# Patient Record
Sex: FEMALE | Race: OTHER | Employment: UNEMPLOYED | URBAN - METROPOLITAN AREA
[De-identification: names, ages, dates, MRNs, and addresses within clinical notes are randomized per-mention and may not be internally consistent; named-entity substitution may affect disease eponyms.]

---

## 2021-05-13 ENCOUNTER — HOSPITAL ENCOUNTER (EMERGENCY)
Age: 61
Discharge: HOME | End: 2021-05-13
Attending: EMERGENCY MEDICINE
Payer: COMMERCIAL

## 2021-05-13 VITALS
SYSTOLIC BLOOD PRESSURE: 155 MMHG | TEMPERATURE: 98.4 F | BODY MASS INDEX: 26.43 KG/M2 | DIASTOLIC BLOOD PRESSURE: 77 MMHG | WEIGHT: 140 LBS | RESPIRATION RATE: 16 BRPM | OXYGEN SATURATION: 96 % | HEIGHT: 61 IN | HEART RATE: 77 BPM

## 2021-05-13 DIAGNOSIS — M54.41 CHRONIC BILATERAL LOW BACK PAIN WITH BILATERAL SCIATICA: Primary | ICD-10-CM

## 2021-05-13 DIAGNOSIS — G89.29 CHRONIC BILATERAL LOW BACK PAIN WITH BILATERAL SCIATICA: Primary | ICD-10-CM

## 2021-05-13 DIAGNOSIS — M54.42 CHRONIC BILATERAL LOW BACK PAIN WITH BILATERAL SCIATICA: Primary | ICD-10-CM

## 2021-05-13 PROCEDURE — 6360000002 HC RX W HCPCS: Performed by: STUDENT IN AN ORGANIZED HEALTH CARE EDUCATION/TRAINING PROGRAM

## 2021-05-13 PROCEDURE — 96372 THER/PROPH/DIAG INJ SC/IM: CPT

## 2021-05-13 PROCEDURE — 96374 THER/PROPH/DIAG INJ IV PUSH: CPT

## 2021-05-13 PROCEDURE — 6370000000 HC RX 637 (ALT 250 FOR IP): Performed by: STUDENT IN AN ORGANIZED HEALTH CARE EDUCATION/TRAINING PROGRAM

## 2021-05-13 PROCEDURE — 99285 EMERGENCY DEPT VISIT HI MDM: CPT

## 2021-05-13 RX ORDER — OXCARBAZEPINE 300 MG/1
300 TABLET, FILM COATED ORAL 2 TIMES DAILY
COMMUNITY
Start: 2021-02-19

## 2021-05-13 RX ORDER — OXYCODONE HYDROCHLORIDE 5 MG/1
5 TABLET ORAL ONCE
Status: COMPLETED | OUTPATIENT
Start: 2021-05-13 | End: 2021-05-13

## 2021-05-13 RX ORDER — MELOXICAM 15 MG/1
15 TABLET ORAL DAILY
COMMUNITY

## 2021-05-13 RX ORDER — KETOROLAC TROMETHAMINE 15 MG/ML
15 INJECTION, SOLUTION INTRAMUSCULAR; INTRAVENOUS ONCE
Status: COMPLETED | OUTPATIENT
Start: 2021-05-13 | End: 2021-05-13

## 2021-05-13 RX ORDER — GABAPENTIN 300 MG/1
300 CAPSULE ORAL 3 TIMES DAILY
COMMUNITY
Start: 2021-05-11

## 2021-05-13 RX ORDER — ORPHENADRINE CITRATE 30 MG/ML
60 INJECTION INTRAMUSCULAR; INTRAVENOUS ONCE
Status: COMPLETED | OUTPATIENT
Start: 2021-05-13 | End: 2021-05-13

## 2021-05-13 RX ORDER — ACETAMINOPHEN 500 MG
1000 TABLET ORAL ONCE
Status: COMPLETED | OUTPATIENT
Start: 2021-05-13 | End: 2021-05-13

## 2021-05-13 RX ORDER — CITALOPRAM 10 MG/1
20 TABLET ORAL EVERY OTHER DAY
COMMUNITY

## 2021-05-13 RX ADMIN — ORPHENADRINE CITRATE 60 MG: 30 INJECTION INTRAMUSCULAR; INTRAVENOUS at 13:20

## 2021-05-13 RX ADMIN — ACETAMINOPHEN 1000 MG: 500 TABLET ORAL at 13:56

## 2021-05-13 RX ADMIN — OXYCODONE HYDROCHLORIDE 5 MG: 5 TABLET ORAL at 13:56

## 2021-05-13 RX ADMIN — KETOROLAC TROMETHAMINE 15 MG: 15 INJECTION, SOLUTION INTRAMUSCULAR; INTRAVENOUS at 13:13

## 2021-05-13 ASSESSMENT — ENCOUNTER SYMPTOMS
ABDOMINAL PAIN: 0
SHORTNESS OF BREATH: 0
VOMITING: 0
BACK PAIN: 1
NAUSEA: 0
DIARRHEA: 0

## 2021-05-13 ASSESSMENT — PAIN DESCRIPTION - PROGRESSION: CLINICAL_PROGRESSION: GRADUALLY IMPROVING

## 2021-05-13 ASSESSMENT — PAIN DESCRIPTION - FREQUENCY: FREQUENCY: CONTINUOUS

## 2021-05-13 ASSESSMENT — PAIN DESCRIPTION - DESCRIPTORS: DESCRIPTORS: ACHING;CONSTANT;RADIATING;SHARP;SHOOTING

## 2021-05-13 ASSESSMENT — PAIN SCALES - GENERAL: PAINLEVEL_OUTOF10: 10

## 2021-05-13 ASSESSMENT — PAIN DESCRIPTION - ORIENTATION: ORIENTATION: LEFT;RIGHT;LOWER;MID

## 2021-05-13 NOTE — ED PROVIDER NOTES
described in the history of present illness. He/she was evaluated in the context of the global COVID-19 pandemic, which necessitated consideration that the patient might be at risk for infection with the SARS-CoV-2 virus that causes COVID-19. Institutional protocols and algorithms that pertain to the evaluation of patients at risk for COVID-19 are in a state of rapid change based on information released by regulatory bodies including the CDC and federal and state organizations. These policies and algorithms were followed during the patient's care in the ED. (Please note that portions of this note were completed with a voice recognition program. Efforts were made to edit the dictations but occasionally words are mis-transcribed.  Whenever words are used in this note in any gender, they shall be construed as though they were used in the gender appropriate to the circumstances; and whenever words are used in this note in the singular or plural form, they shall be construed as though they were used in the form appropriate to the circumstances.)    MD Sreedhar Raya  Attending Emergency Medicine Physician           Joseph Mcdaniel MD  05/13/21 [address] [place] [address] Street, MD  05/13/21 [address] [place], [place] [address]Th Street, MD  05/13/21 1941

## 2021-05-13 NOTE — ED PROVIDER NOTES
[facility] ED  Emergency Department Encounter  Emergency Medicine Resident     Pt Name: Candi Larios  MRN: [id number]  [place] 1960  Date of evaluation: 5/13/21  PCP:  Sunni Emerson DO    CHIEF COMPLAINT       Chief Complaint   Patient presents with    Back Pain     on going chronic back pain with bulging disc uinable to get out of bed today from too much pain       HISTORY OFPRESENT ILLNESS  (Location/Symptom, Timing/Onset, Context/Setting, Quality, Duration, Modifying Factors,Severity.)      Candi Larios is a 62 y.o. female who presents with cute on chronic low back pain. Patient states she has been dealing with this for several months. She is currently following up with physical medicine and rehabilitation and tells me she has an appointment with neurosurgery at the end of the month. For the past 2 days, patient has had a lot of difficulty getting out of bed due to severe pain. The pain is usually more right-sided than left, but both sides radiate down the back of her legs towards her ankles. She denies any weakness, numbness, tingling. No saddle anesthesia, urinary or bowel retention or incontinence. Patient's past medical history also significant for seizure disorder. She states she has been taking her medications as scheduled. She also was recently started on gabapentin and tramadol but has only taken about 1 days worth of these medications. PAST MEDICAL / SURGICAL / SOCIAL / FAMILY HISTORY      has no past medical history on file. has no past surgical history on file.      Social History     Socioeconomic History    Marital status:      Spouse name: Not on file    Number of children: Not on file    Years of education: Not on file    Highest education level: Not on file   Occupational History    Not on file   Social Needs    Financial resource strain: Not on file    Food insecurity     Worry: Not on file     Inability: Not on file   [place] needs     Medical: Not on file     Non-medical: Not on file   Tobacco Use    Smoking status: Not on file   Substance and Sexual Activity    Alcohol use: Not on file    Drug use: Not on file    Sexual activity: Not on file   Lifestyle    Physical activity     Days per week: Not on file     Minutes per session: Not on file    Stress: Not on file   Relationships    Social connections     Talks on phone: Not on file     Gets together: Not on file     Attends Islam service: Not on file     Active member of club or organization: Not on file     Attends meetings of clubs or organizations: Not on file     Relationship status: Not on file    Intimate partner violence     Fear of current or ex partner: Not on file     Emotionally abused: Not on file     Physically abused: Not on file     Forced sexual activity: Not on file   Other Topics Concern    Not on file   Social History Narrative    Not on file       No family history on file. Allergies:  Patient has no known allergies. Home Medications:  Prior to Admission medications    Medication Sig Start Date End Date Taking? Authorizing Provider   OXcarbazepine (TRILEPTAL) 300 MG tablet Take 300 mg by mouth 2 times daily 2/19/21  Yes Historical Provider, MD   gabapentin (NEURONTIN) 300 MG capsule Take 300 mg by mouth 3 times daily. 5/11/21  Yes Historical Provider, MD   citalopram (CELEXA) 10 MG tablet Take 20 mg by mouth every other day    Historical Provider, MD   meloxicam (MOBIC) 15 MG tablet Take 15 mg by mouth daily    Historical Provider, MD   Multiple Vitamins-Minerals (MULTIVITAMIN ADULT EXTRA C PO) Take 1 tablet by mouth daily    Historical Provider, MD       REVIEW OFSYSTEMS    (2-9 systems for level 4, 10 or more for level 5)      Review of Systems   Constitutional: Negative for chills and fever. HENT: Negative for congestion. Eyes: Negative for visual disturbance. Respiratory: Negative for shortness of breath.     Cardiovascular: Negative for chest pain. Gastrointestinal: Negative for abdominal pain, diarrhea, nausea and vomiting. Genitourinary: Negative for dysuria. Musculoskeletal: Positive for back pain. Negative for neck pain. Skin: Negative for rash and wound. Neurological: Negative for weakness, numbness and headaches. PHYSICAL EXAM   (up to 7 for level 4, 8 or more forlevel 5)      INITIAL VITALS:   ED Triage Vitals [05/13/21 1226]   BP Temp Temp Source Pulse Resp SpO2 Height Weight   (!) 155/77 98.4 °F (36.9 °C) Oral 77 16 96 % 5' 1\" (1.549 m) 140 lb (63.5 kg)       Physical Exam  Constitutional:       General: She is not in acute distress. Appearance: Normal appearance. She is normal weight. She is not ill-appearing, toxic-appearing or diaphoretic. HENT:      Head: Normocephalic and atraumatic. Nose: Nose normal.      Mouth/Throat:      Mouth: Mucous membranes are moist.      Pharynx: Oropharynx is clear. Eyes:      Extraocular Movements: Extraocular movements intact. Pupils: Pupils are equal, round, and reactive to light. Neck:      Musculoskeletal: Normal range of motion and neck supple. Cardiovascular:      Rate and Rhythm: Normal rate and regular rhythm. Heart sounds: Normal heart sounds. No murmur. Pulmonary:      Effort: Pulmonary effort is normal. No respiratory distress. Breath sounds: Normal breath sounds. No wheezing, rhonchi or rales. Abdominal:      General: There is no distension. Palpations: Abdomen is soft. Tenderness: There is no abdominal tenderness. There is no guarding or rebound. Musculoskeletal: Normal range of motion. Right lower leg: No edema. Left lower leg: No edema. Comments: Tenderness palpation over the lumbar region of the back, bilaterally. No overlying rashes or wounds. Straight leg test worsens pain. No obvious injuries or swelling. Skin:     General: Skin is warm and dry. Neurological:      General: No focal deficit present. Mental Status: She is alert and oriented to person, place, and time. Sensory: No sensory deficit. Motor: No weakness. Comments: Lower extremity strength and sensation symmetrical and normal.  Patient denies saddle anesthesia. Psychiatric:         Mood and Affect: Mood normal.         DIFFERENTIAL  DIAGNOSIS     PLAN (LABS / IMAGING / EKG):  No orders of the defined types were placed in this encounter. MEDICATIONS ORDERED:  Orders Placed This Encounter   Medications    ketorolac (TORADOL) injection 15 mg    orphenadrine (NORFLEX) injection 60 mg    acetaminophen (TYLENOL) tablet 1,000 mg    oxyCODONE (ROXICODONE) immediate release tablet 5 mg     Initial MDM/Plan/ED COURSE:    62 y.o. female who presents with acute on chronic low back pain. Patient does follow with physical medicine rehabilitation and has clinic appointment for neurosurgery at the end of the month. Pain has become severe over the past few days, not associated with weakness, numbness, tingling. No saddle anesthesia, urinary or bowel incontinence or retention. On exam, patient is in mild distress when moving due to pain, but otherwise resting comfortably in bed. Vitals are within normal limits with blood pressure slightly elevated at 155/77. Strength and sensation are symmetrical and normal in the lower extremities. Straight leg test does worsen the pain in the low back. Will provide analgesia first with Toradol and Norflex. Will reevaluate and consider giving 1 dose of opioid here in the ED. Discussed with patient that her follow-up and continued work with PM&R are very important for improving this pain. We will also provide information for neurosurgery clinic, but she tells me she has an appointment at the end of the month with neurosurgery. Patient did not have significant improvement with the Toradol and muscle relaxant.   We will give Tylenol and Roxicodone, and discharge home.      :     DIAGNOSTIC RESULTS / EMERGENCYDEPARTMENT COURSE / MDM     LABS:  Labs Reviewed - No data to display        No results found. EKG  Not indicated at this time. All EKG's are interpreted by the Emergency Department Physicianwho either signs or Co-signs this chart in the absence of a cardiologist.      PROCEDURES:  None    CONSULTS:  None    CRITICAL CARE:  Please see attending note    FINAL IMPRESSION      1.  Chronic bilateral low back pain with bilateral sciatica          DISPOSITION / PLAN     DISPOSITION Decision To Discharge 05/13/2021 01:48:59 PM      PATIENT REFERRED TO:  Alberto Gorman DO  [address]  [phone]    Call [facility]  [address] [address]  [phone]    If symptoms worsen    [address]            DISCHARGE MEDICATIONS:  New Prescriptions    No medications on file       Praveen Vasquez DO  Emergency Medicine Resident    (Please note that portions of this note were completed with a voice recognition program.Efforts were made to edit the dictations but occasionally words are mis-transcribed.)       Praveen Vasquez DO  Resident  05/13/21 1078

## 2021-06-01 ENCOUNTER — HOSPITAL ENCOUNTER (EMERGENCY)
Age: 61
Discharge: HOME | End: 2021-06-01
Attending: EMERGENCY MEDICINE
Payer: COMMERCIAL

## 2021-06-01 VITALS
RESPIRATION RATE: 18 BRPM | DIASTOLIC BLOOD PRESSURE: 65 MMHG | SYSTOLIC BLOOD PRESSURE: 138 MMHG | TEMPERATURE: 97.3 F | OXYGEN SATURATION: 96 % | HEART RATE: 78 BPM

## 2021-06-01 DIAGNOSIS — G89.29 ACUTE EXACERBATION OF CHRONIC LOW BACK PAIN: Primary | ICD-10-CM

## 2021-06-01 DIAGNOSIS — M54.50 ACUTE EXACERBATION OF CHRONIC LOW BACK PAIN: Primary | ICD-10-CM

## 2021-06-01 PROCEDURE — 99283 EMERGENCY DEPT VISIT LOW MDM: CPT

## 2021-06-01 PROCEDURE — 96372 THER/PROPH/DIAG INJ SC/IM: CPT

## 2021-06-01 PROCEDURE — 6360000002 HC RX W HCPCS: Performed by: STUDENT IN AN ORGANIZED HEALTH CARE EDUCATION/TRAINING PROGRAM

## 2021-06-01 RX ORDER — MORPHINE SULFATE 4 MG/ML
4 INJECTION, SOLUTION INTRAMUSCULAR; INTRAVENOUS ONCE
Status: DISCONTINUED | OUTPATIENT
Start: 2021-06-01 | End: 2021-06-01

## 2021-06-01 RX ORDER — CYCLOBENZAPRINE HCL 10 MG
10 TABLET ORAL 3 TIMES DAILY PRN
Qty: 21 TABLET | Refills: 0 | Status: SHIPPED | OUTPATIENT
Start: 2021-06-01 | End: 2021-06-11

## 2021-06-01 RX ORDER — MORPHINE SULFATE 4 MG/ML
4 INJECTION, SOLUTION INTRAMUSCULAR; INTRAVENOUS ONCE
Status: COMPLETED | OUTPATIENT
Start: 2021-06-01 | End: 2021-06-01

## 2021-06-01 RX ORDER — LIDOCAINE 50 MG/G
1 PATCH TOPICAL DAILY
Qty: 10 PATCH | Refills: 0 | Status: SHIPPED | OUTPATIENT
Start: 2021-06-01 | End: 2021-06-11

## 2021-06-01 RX ADMIN — MORPHINE SULFATE 4 MG: 4 INJECTION INTRAVENOUS at 07:30

## 2021-06-01 ASSESSMENT — PAIN SCALES - GENERAL
PAINLEVEL_OUTOF10: 10
PAINLEVEL_OUTOF10: 10

## 2021-06-01 ASSESSMENT — PAIN DESCRIPTION - LOCATION: LOCATION: BACK

## 2021-06-01 NOTE — ED NOTES
Pt assisted up to bedside commode. Pt wheeled out to triage in wheelchair.      Vic Lee RN  06/01/21 9317

## 2021-06-01 NOTE — ED PROVIDER NOTES
[address] ED     Emergency Department     Faculty Attestation        I performed a history and physical examination of the patient and discussed management with the resident. I reviewed the residents note and agree with the documented findings and plan of care. Any areas of disagreement are noted on the chart. I was personally present for the key portions of any procedures. I have documented in the chart those procedures where I was not present during the key portions. I have reviewed the emergency nurses triage note. I agree with the chief complaint, past medical history, past surgical history, allergies, medications, social and family history as documented unless otherwise noted below. For mid-level providers such as nurse practitioners as well as physicians assistants:    I have personally seen and evaluated the patient. I find the patient's history and physical exam are consistent with NP/PA documentation. I agree with the care provided, treatment rendered, disposition, & follow-up plan. Additional findings are as noted. Vital Signs: /65   Pulse 78   Temp 97.3 °F (36.3 °C) (Oral)   Resp 18   SpO2 96%   PCP:  Fco Sheffield,     Pertinent Comments:       Exacerbation of chronic back pain scheduled for surgery next week. Denies any fevers or chills or falls or trauma. No anticoagulation use. No history of IV drug abuse. She is able to walk and ambulate in the examination room for me with no assistance. She has 5 out of 5 strength in lower extremities. She denies any bowel or bladder incontinence but states the pain is so severe she cannot get to the bathroom in time. We provide analgesics, reassessment. At this time there is no indication for emergent MRI imaging.     Critical Care  None          Jasmin Noyola MD    Attending Emergency Medicine Physician              Flower Ybarra MD  06/01/21 9002

## 2021-06-01 NOTE — ED NOTES
Bed: 20  Expected date:   Expected time:   Means of arrival:   Comments:  [facility] [address] [facility], RN  06/01/21 3654

## 2021-06-01 NOTE — ED PROVIDER NOTES
[address]  ED  Emergency Department Encounter  EmergencyMedicine Resident     Pt Name:Iza Baez  MRN: [id number]  [place] 1960  Date of evaluation: 6/1/21  PCP:  Pilo Roque DO    CHIEF COMPLAINT       Chief Complaint   Patient presents with    Back Pain     has surgery next wednesday       HISTORY OF PRESENT ILLNESS  (Location/Symptom, Timing/Onset, Context/Setting, Quality, Duration, Modifying Factors, Severity.)      Iza Baez is a 62 y.o. female who presents with acute on chronic lower back pain. Patient reporting that she has history of chronic low back pain with degenerative disease and arthritic changes. Patient stating that she has had 3 worsening back pain over the past 24 hours. States she is scheduled for surgery next week for lumbar laminectomy. Patient denies any recent injury. Denies saddle anesthesia, urinary retention, urinary incontinence, gait abnormality, numbness, weakness. PAST MEDICAL / SURGICAL / SOCIAL / FAMILY HISTORY   History of chronic low back pain    Denies any significant surgical history    Social History     Socioeconomic History    Marital status:      Spouse name: Not on file    Number of children: Not on file    Years of education: Not on file    Highest education level: Not on file   Occupational History    Not on file   Tobacco Use    Smoking status: Not on file   Substance and Sexual Activity    Alcohol use: Not on file    Drug use: Not on file    Sexual activity: Not on file   Other Topics Concern    Not on file   Social History Narrative    Not on file     Social Determinants of Health     Financial Resource Strain:     Difficulty of Paying Living Expenses:    Food Insecurity:     Worried About Running Out of Food in the Last Year:     [address] in the Last Year:    Transportation Needs:     Lack of Transportation (Medical):      Lack of Transportation (Non-Medical):    Physical Activity:     Days of Exercise per Week:     Minutes of Exercise per Session:    Stress:     Feeling of Stress :    Social Connections:     Frequency of Communication with Friends and Family:     Frequency of Social Gatherings with Friends and Family:     Attends Ten[employer] Services:     Active Member of Clubs or Organizations:     Attends Club or Organization Meetings:     Marital Status:    Intimate Partner Violence:     Fear of Current or Ex-Partner:     Emotionally Abused:     Physically Abused:     Sexually Abused:        No family history on file. Allergies:  Patient has no known allergies. Home Medications:  Prior to Admission medications    Medication Sig Start Date End Date Taking? Authorizing Provider   lidocaine (LIDODERM) 5 % Place 1 patch onto the skin daily for 10 days 12 hours on, 12 hours off. 6/1/21 6/11/21 Yes Miguel Turcios, DO   cyclobenzaprine (FLEXERIL) 10 MG tablet Take 1 tablet by mouth 3 times daily as needed for Muscle spasms 6/1/21 6/11/21 Yes Miguel Turcios, DO   OXcarbazepine (TRILEPTAL) 300 MG tablet Take 300 mg by mouth 2 times daily 2/19/21   Historical Provider, MD   citalopram (CELEXA) 10 MG tablet Take 20 mg by mouth every other day    Historical Provider, MD   gabapentin (NEURONTIN) 300 MG capsule Take 300 mg by mouth 3 times daily. 5/11/21   Historical Provider, MD   meloxicam (MOBIC) 15 MG tablet Take 15 mg by mouth daily    Historical Provider, MD   Multiple Vitamins-Minerals (MULTIVITAMIN ADULT EXTRA C PO) Take 1 tablet by mouth daily    Historical Provider, MD       REVIEW OF SYSTEMS    (2-9 systems for level 4, 10 or more for level 5)      Review of Systems   Constitutional: Negative for fatigue and fever. HENT: Negative for sinus pressure and sinus pain. Eyes: Negative for photophobia and visual disturbance. Respiratory: Negative for cough and shortness of breath. Cardiovascular: Negative for chest pain.    Gastrointestinal: Negative for abdominal pain, nausea and vomiting. Endocrine: Negative for polyuria. Genitourinary: Negative for difficulty urinating, dysuria and flank pain. Musculoskeletal: Positive for back pain and gait problem. Negative for neck pain and neck stiffness. Skin: Negative for rash and wound. Neurological: Negative for dizziness, seizures, facial asymmetry, weakness and numbness. Psychiatric/Behavioral: Negative for confusion. PHYSICAL EXAM   (up to 7 for level 4, 8 or more for level 5)      INITIAL VITALS:   /65   Pulse 78   Temp 97.3 °F (36.3 °C) (Oral)   Resp 18   SpO2 96%     Physical Exam  Constitutional:       General: She is not in acute distress. Appearance: She is not toxic-appearing. HENT:      Head: Normocephalic and atraumatic. Nose: No congestion or rhinorrhea. Mouth/Throat:      Mouth: Mucous membranes are moist.      Pharynx: Oropharynx is clear. Cardiovascular:      Rate and Rhythm: Normal rate. Heart sounds: No murmur heard. No friction rub. No gallop. Pulmonary:      Effort: No respiratory distress. Breath sounds: No wheezing, rhonchi or rales. Abdominal:      General: There is no distension. Tenderness: There is no abdominal tenderness. There is no guarding. Musculoskeletal:         General: No tenderness. Right lower leg: No edema. Left lower leg: No edema. Skin:     Findings: No bruising, lesion or rash. Neurological:      Mental Status: She is alert. Sensory: No sensory deficit. Motor: No weakness. Gait: Gait normal.         DIFFERENTIAL  DIAGNOSIS     PLAN (LABS / IMAGING / EKG):  No orders of the defined types were placed in this encounter. MEDICATIONS ORDERED:  Orders Placed This Encounter   Medications    DISCONTD: morphine injection 4 mg    morphine (PF) injection 4 mg    lidocaine (LIDODERM) 5 %     Sig: Place 1 patch onto the skin daily for 10 days 12 hours on, 12 hours off.      Dispense:  10 patch     Refill:  0    DO  Resident  06/03/21 7092

## 2021-06-02 ASSESSMENT — ENCOUNTER SYMPTOMS
SINUS PRESSURE: 0
VOMITING: 0
SINUS PAIN: 0
NAUSEA: 0
BACK PAIN: 1
PHOTOPHOBIA: 0
ABDOMINAL PAIN: 0
COUGH: 0
SHORTNESS OF BREATH: 0

## 2021-06-06 ENCOUNTER — APPOINTMENT (OUTPATIENT)
Dept: GENERAL RADIOLOGY | Age: 61
End: 2021-06-06
Payer: COMMERCIAL

## 2021-06-06 ENCOUNTER — HOSPITAL ENCOUNTER (EMERGENCY)
Age: 61
Discharge: HOME | End: 2021-06-06
Attending: EMERGENCY MEDICINE
Payer: COMMERCIAL

## 2021-06-06 VITALS
HEIGHT: 64 IN | SYSTOLIC BLOOD PRESSURE: 124 MMHG | BODY MASS INDEX: 23.05 KG/M2 | DIASTOLIC BLOOD PRESSURE: 72 MMHG | OXYGEN SATURATION: 97 % | RESPIRATION RATE: 18 BRPM | WEIGHT: 135 LBS | HEART RATE: 94 BPM | TEMPERATURE: 98.3 F

## 2021-06-06 DIAGNOSIS — G89.29 CHRONIC MIDLINE LOW BACK PAIN, UNSPECIFIED WHETHER SCIATICA PRESENT: Primary | ICD-10-CM

## 2021-06-06 DIAGNOSIS — M54.50 CHRONIC MIDLINE LOW BACK PAIN, UNSPECIFIED WHETHER SCIATICA PRESENT: Primary | ICD-10-CM

## 2021-06-06 LAB
ABSOLUTE EOS #: 0.22 K/UL (ref 0–0.44)
ABSOLUTE IMMATURE GRANULOCYTE: 0.09 K/UL (ref 0–0.3)
ABSOLUTE LYMPH #: 2.13 K/UL (ref 1.1–3.7)
ABSOLUTE MONO #: 0.75 K/UL (ref 0.1–1.2)
ANION GAP SERPL CALCULATED.3IONS-SCNC: 8 MMOL/L (ref 9–17)
BASOPHILS # BLD: 1 % (ref 0–2)
BASOPHILS ABSOLUTE: 0.06 K/UL (ref 0–0.2)
BUN BLDV-MCNC: 15 MG/DL (ref 6–20)
BUN/CREAT BLD: ABNORMAL (ref 9–20)
C-REACTIVE PROTEIN: 11.9 MG/L (ref 0–5)
CALCIUM SERPL-MCNC: 8.9 MG/DL (ref 8.6–10.4)
CHLORIDE BLD-SCNC: 100 MMOL/L (ref 98–107)
CO2: 28 MMOL/L (ref 20–31)
CREAT SERPL-MCNC: 0.72 MG/DL (ref 0.5–0.9)
DIFFERENTIAL TYPE: ABNORMAL
EOSINOPHILS RELATIVE PERCENT: 2 % (ref 1–4)
GFR AFRICAN AMERICAN: >60 ML/MIN
GFR NON-AFRICAN AMERICAN: >60 ML/MIN
GFR SERPL CREATININE-BSD FRML MDRD: ABNORMAL ML/MIN/{1.73_M2}
GFR SERPL CREATININE-BSD FRML MDRD: ABNORMAL ML/MIN/{1.73_M2}
GLUCOSE BLD-MCNC: 111 MG/DL (ref 70–99)
HCT VFR BLD CALC: 33.9 % (ref 36.3–47.1)
HEMOGLOBIN: 11 G/DL (ref 11.9–15.1)
IMMATURE GRANULOCYTES: 1 %
INR BLD: 0.9
LYMPHOCYTES # BLD: 21 % (ref 24–43)
MCH RBC QN AUTO: 30.6 PG (ref 25.2–33.5)
MCHC RBC AUTO-ENTMCNC: 32.4 G/DL (ref 28.4–34.8)
MCV RBC AUTO: 94.4 FL (ref 82.6–102.9)
MONOCYTES # BLD: 8 % (ref 3–12)
NRBC AUTOMATED: 0 PER 100 WBC
PARTIAL THROMBOPLASTIN TIME: 25.9 SEC (ref 20.5–30.5)
PDW BLD-RTO: 12.7 % (ref 11.8–14.4)
PLATELET # BLD: 432 K/UL (ref 138–453)
PLATELET ESTIMATE: ABNORMAL
PMV BLD AUTO: 8.2 FL (ref 8.1–13.5)
POTASSIUM SERPL-SCNC: 3.6 MMOL/L (ref 3.7–5.3)
PROTHROMBIN TIME: 9.5 SEC (ref 9.1–12.3)
RBC # BLD: 3.59 M/UL (ref 3.95–5.11)
RBC # BLD: ABNORMAL 10*6/UL
SEDIMENTATION RATE, ERYTHROCYTE: 36 MM (ref 0–30)
SEG NEUTROPHILS: 67 % (ref 36–65)
SEGMENTED NEUTROPHILS ABSOLUTE COUNT: 6.79 K/UL (ref 1.5–8.1)
SODIUM BLD-SCNC: 136 MMOL/L (ref 135–144)
WBC # BLD: 10 K/UL (ref 3.5–11.3)
WBC # BLD: ABNORMAL 10*3/UL

## 2021-06-06 PROCEDURE — 80048 BASIC METABOLIC PNL TOTAL CA: CPT

## 2021-06-06 PROCEDURE — 85610 PROTHROMBIN TIME: CPT

## 2021-06-06 PROCEDURE — 99285 EMERGENCY DEPT VISIT HI MDM: CPT

## 2021-06-06 PROCEDURE — 73610 X-RAY EXAM OF ANKLE: CPT

## 2021-06-06 PROCEDURE — 85652 RBC SED RATE AUTOMATED: CPT

## 2021-06-06 PROCEDURE — 85730 THROMBOPLASTIN TIME PARTIAL: CPT

## 2021-06-06 PROCEDURE — 85025 COMPLETE CBC W/AUTO DIFF WBC: CPT

## 2021-06-06 PROCEDURE — 86140 C-REACTIVE PROTEIN: CPT

## 2021-06-06 PROCEDURE — 6370000000 HC RX 637 (ALT 250 FOR IP): Performed by: STUDENT IN AN ORGANIZED HEALTH CARE EDUCATION/TRAINING PROGRAM

## 2021-06-06 RX ORDER — KETOROLAC TROMETHAMINE 30 MG/ML
30 INJECTION, SOLUTION INTRAMUSCULAR; INTRAVENOUS ONCE
Status: DISCONTINUED | OUTPATIENT
Start: 2021-06-06 | End: 2021-06-06

## 2021-06-06 RX ORDER — OXYCODONE HYDROCHLORIDE AND ACETAMINOPHEN 5; 325 MG/1; MG/1
1 TABLET ORAL EVERY 6 HOURS PRN
Qty: 6 TABLET | Refills: 0 | Status: SHIPPED | OUTPATIENT
Start: 2021-06-06 | End: 2021-06-09

## 2021-06-06 RX ORDER — ACETAMINOPHEN 325 MG/1
325 TABLET ORAL ONCE
Status: COMPLETED | OUTPATIENT
Start: 2021-06-06 | End: 2021-06-06

## 2021-06-06 RX ORDER — TRAMADOL HYDROCHLORIDE 50 MG/1
50 TABLET ORAL EVERY 6 HOURS PRN
COMMUNITY

## 2021-06-06 RX ORDER — OXYCODONE HYDROCHLORIDE AND ACETAMINOPHEN 5; 325 MG/1; MG/1
1 TABLET ORAL ONCE
Status: COMPLETED | OUTPATIENT
Start: 2021-06-06 | End: 2021-06-06

## 2021-06-06 RX ORDER — LIDOCAINE 4 G/G
1 PATCH TOPICAL ONCE
Status: DISCONTINUED | OUTPATIENT
Start: 2021-06-06 | End: 2021-06-07 | Stop reason: HOSPADM

## 2021-06-06 RX ADMIN — OXYCODONE HYDROCHLORIDE AND ACETAMINOPHEN 1 TABLET: 5; 325 TABLET ORAL at 21:05

## 2021-06-06 RX ADMIN — ACETAMINOPHEN 325 MG: 325 TABLET ORAL at 21:06

## 2021-06-06 ASSESSMENT — PAIN SCALES - GENERAL: PAINLEVEL_OUTOF10: 6

## 2021-06-06 ASSESSMENT — PAIN DESCRIPTION - LOCATION: LOCATION: BACK

## 2021-06-07 NOTE — ED NOTES
Pt reports c/o worsening & uncontrollable chronic back pain, pt states she has a lumbar laminectomy scheduled for 06/09/2021, pt also c/o RLE swelling, ecchymosis noted to lateral RLE, pt tearful, pt A&Ox4, RR even/unlabored, call light within reach, will cont to monitor        Les Tompkins RN  06/06/21 2048

## 2021-06-07 NOTE — ED PROVIDER NOTES
Packs/day: 1.00     Types: Cigarettes   Substance and Sexual Activity    Alcohol use: Never    Drug use: Never    Sexual activity: Not on file   Other Topics Concern    Not on file   Social History Narrative    Not on file     Social Determinants of Health     Financial Resource Strain:     Difficulty of Paying Living Expenses:    Food Insecurity:     Worried About Running Out of Food in the Last Year:     [address] in the Last Year:    Transportation Needs:     Lack of Transportation (Medical):  Lack of Transportation (Non-Medical):    Physical Activity:     Days of Exercise per Week:     Minutes of Exercise per Session:    Stress:     Feeling of Stress :    Social Connections:     Frequency of Communication with Friends and Family:     Frequency of Social Gatherings with Friends and Family:     Attends Islam Services:     Active Member of Clubs or Organizations:     Attends Club or Organization Meetings:     Marital Status:    Intimate Partner Violence:     Fear of Current or Ex-Partner:     Emotionally Abused:     Physically Abused:     Sexually Abused:        History reviewed. No pertinent family history. Allergies:  Patient has no known allergies. Home Medications:  Prior to Admission medications    Medication Sig Start Date End Date Taking? Authorizing Provider   traMADol (ULTRAM) 50 MG tablet Take 50 mg by mouth every 6 hours as needed for Pain. Yes Historical Provider, MD   oxyCODONE-acetaminophen (PERCOCET) 5-325 MG per tablet Take 1 tablet by mouth every 6 hours as needed for Pain for up to 3 days. Intended supply: 3 days.  Take lowest dose possible to manage pain 6/6/21 6/9/21 Yes Jeffy Bennett MD   lidocaine (LIDODERM) 5 % Place 1 patch onto the skin daily for 10 days 12 hours on, 12 hours off. 6/1/21 6/11/21  Polina Mandujano DO   cyclobenzaprine (FLEXERIL) 10 MG tablet Take 1 tablet by mouth 3 times daily as needed for Muscle spasms 6/1/21 6/11/21  [facility], DO   OXcarbazepine (TRILEPTAL) 300 MG tablet Take 300 mg by mouth 2 times daily 2/19/21   Historical Provider, MD   citalopram (CELEXA) 10 MG tablet Take 20 mg by mouth every other day    Historical Provider, MD   gabapentin (NEURONTIN) 300 MG capsule Take 300 mg by mouth 3 times daily. 5/11/21   Historical Provider, MD   meloxicam (MOBIC) 15 MG tablet Take 15 mg by mouth daily    Historical Provider, MD   Multiple Vitamins-Minerals (MULTIVITAMIN ADULT EXTRA C PO) Take 1 tablet by mouth daily    Historical Provider, MD       REVIEW OF SYSTEMS    (2-9 systems for level 4, 10 or more for level 5)      Review of Systems   Constitutional: Negative for fever. HENT: Negative for congestion. Eyes: Negative for photophobia. Respiratory: Negative for shortness of breath. Cardiovascular: Negative for chest pain. Gastrointestinal: Negative for abdominal pain and vomiting. Endocrine: Negative for polyuria. Genitourinary: Negative for dysuria. Musculoskeletal: Positive for arthralgias. Skin: Negative for color change. Allergic/Immunologic: Negative for immunocompromised state. Neurological: Negative for dizziness. Hematological: Does not bruise/bleed easily. Psychiatric/Behavioral: Negative for agitation. PHYSICAL EXAM   (up to 7 for level 4, 8 or more for level 5)      INITIAL VITALS:   /72   Pulse 94   Temp 98.3 °F (36.8 °C) (Oral)   Resp 18   Ht 5' 4\" (1.626 m)   Wt 135 lb (61.2 kg)   SpO2 97%   BMI 23.17 kg/m²     Physical Exam  Constitutional:       General: Not in acute distress. Appearance: Normal appearance. Normal weight. Not toxic-appearing. HENT:      Head: Normocephalic and atraumatic. Nose: Nose normal.      Mouth/Throat: Mucous membranes are moist.  Uvula midline no oropharyngeal edema. Pharynx: Oropharynx is clear. Eyes:      Extraocular Movements: Extraocular movements intact.       Conjunctiva/sclera: Conjunctivae normal.      Pupils: Pupils Question:   Please select a reason the therapeutic interchange was not accepted: Answer:   [place] for Pharmacy to Substitute    acetaminophen (TYLENOL) tablet 325 mg    oxyCODONE-acetaminophen (PERCOCET) 5-325 MG per tablet     Sig: Take 1 tablet by mouth every 6 hours as needed for Pain for up to 3 days. Intended supply: 3 days.  Take lowest dose possible to manage pain     Dispense:  6 tablet     Refill:  0           DIAGNOSTIC RESULTS / EMERGENCY DEPARTMENT COURSE / MDM     LABS:  Results for orders placed or performed during the hospital encounter of 06/06/21   CBC Auto Differential   Result Value Ref Range    WBC 10.0 3.5 - 11.3 k/uL    RBC 3.59 (L) 3.95 - 5.11 m/uL    Hemoglobin 11.0 (L) 11.9 - 15.1 g/dL    Hematocrit 33.9 (L) 36.3 - 47.1 %    MCV 94.4 82.6 - 102.9 fL    MCH 30.6 25.2 - 33.5 pg    MCHC 32.4 28.4 - 34.8 g/dL    RDW 12.7 11.8 - 14.4 %    Platelets 728 498 - 763 k/uL    MPV 8.2 8.1 - 13.5 fL    NRBC Automated 0.0 0.0 per 100 WBC    Differential Type NOT REPORTED     Seg Neutrophils 67 (H) 36 - 65 %    Lymphocytes 21 (L) 24 - 43 %    Monocytes 8 3 - 12 %    Eosinophils % 2 1 - 4 %    Basophils 1 0 - 2 %    Immature Granulocytes 1 (H) 0 %    Segs Absolute 6.79 1.50 - 8.10 k/uL    Absolute Lymph # 2.13 1.10 - 3.70 k/uL    Absolute Mono # 0.75 0.10 - 1.20 k/uL    Absolute Eos # 0.22 0.00 - 0.44 k/uL    Basophils Absolute 0.06 0.00 - 0.20 k/uL    Absolute Immature Granulocyte 0.09 0.00 - 0.30 k/uL    WBC Morphology NOT REPORTED     RBC Morphology NOT REPORTED     Platelet Estimate NOT REPORTED    Basic Metabolic Panel w/ Reflex to MG   Result Value Ref Range    Glucose 111 (H) 70 - 99 mg/dL    BUN 15 6 - 20 mg/dL    CREATININE 0.72 0.50 - 0.90 mg/dL    Bun/Cre Ratio NOT REPORTED 9 - 20    Calcium 8.9 8.6 - 10.4 mg/dL    Sodium 136 135 - 144 mmol/L    Potassium 3.6 (L) 3.7 - 5.3 mmol/L    Chloride 100 98 - 107 mmol/L    CO2 28 20 - 31 mmol/L    Anion Gap 8 (L) 9 - 17 mmol/L    GFR Non-African American >60 >60 mL/min    GFR African American >60 >60 mL/min    GFR Comment          GFR Staging NOT REPORTED    Protime-INR   Result Value Ref Range    Protime 9.5 9.1 - 12.3 sec    INR 0.9    APTT   Result Value Ref Range    PTT 25.9 20.5 - 30.5 sec   Sedimentation Rate   Result Value Ref Range    Sed Rate 36 (H) 0 - 30 mm   C-REACTIVE PROTEIN   Result Value Ref Range    CRP 11.9 (H) 0.0 - 5.0 mg/L         RADIOLOGY:  XR ANKLE RIGHT (MIN 3 VIEWS)    Result Date: 6/6/2021  EXAMINATION: THREE XRAY VIEWS OF THE RIGHT ANKLE 6/6/2021 6:08 pm COMPARISON: None. HISTORY: ORDERING SYSTEM PROVIDED HISTORY: r ankle edema and bruising TECHNOLOGIST PROVIDED HISTORY: r ankle edema and bruising Reason for Exam: rt ankle bruising lateral side FINDINGS: The visualized bones are normal.  There is no evidence of fracture or dislocation. The  joint spaces appear well maintained. Soft tissue swelling about the ankle more notably along the lateral aspect. No acute bony abnormalities are noted       EKG  None    All EKG's are interpreted by the Emergency Department Physician who either signs or Co-signs this chart in the absence of a cardiologist.    EMERGENCY DEPARTMENT COURSE:  Patient breathing quietly and unlabored on room air. Speech is normal and speaking in full sentences without requiring to pause to take a breath. Vital signs stable afebrile. Physical exam as above, there is tenderness to palpation midline spine and new bruising over right knee and right ankle. Patient states that she is safe at home, lives with second-degree relative. States that she is crawling in her house in order to perform her ADLs as this helps reduce her pain. She denies falls or trauma. Patient wants to go to [facility] but the ambulance would only take her here. She was hoping they would admit her for pain control until her surgery on Wednesday.   Today we will do CBC BMP CRP ESR to rule out possible vasculitis due to lower extremity bruising. Will x-ray right ankle rule out acute process. Will do post void residual.  Percocet for pain control Lidoderm patch. X-ray negative for acute process, platelets in the 936V. Slight increase in inflammatory markers. Pain is improved but still there. Postvoid residual is 0 mL. Instructed the patient follow-up with her PCP and neurosurgeon. Patient understands and agrees. PROCEDURES:  None    CONSULTS:  None    CRITICAL CARE:  None    FINAL IMPRESSION      1. Chronic midline low back pain, unspecified whether sciatica present          DISPOSITION / PLAN     DISPOSITION Decision To Discharge 06/06/2021 10:10:30 PM      PATIENT REFERRED TO:  Delinda Blizzard, DO  [address]  [phone]    Schedule an appointment as soon as possible for a visit in 2 days        DISCHARGE MEDICATIONS:  New Prescriptions    OXYCODONE-ACETAMINOPHEN (PERCOCET) 5-325 MG PER TABLET    Take 1 tablet by mouth every 6 hours as needed for Pain for up to 3 days. Intended supply: 3 days.  Take lowest dose possible to manage pain       Sreedhar Jackson MD  Emergency Medicine Resident    (Please note that portions of thisnote were completed with a voice recognition program.  Efforts were made to edit the dictations but occasionally words are mis-transcribed.)       Sreedhar Jackson MD  Resident  06/06/21 [id number]

## 2021-06-07 NOTE — ED PROVIDER NOTES
[address] [facility]     Emergency Department     Faculty Attestation    I performed a history and physical examination of the patient and discussed management with the resident. I reviewed the residents note and agree with the documented findings and plan of care. Any areas of disagreement are noted on the chart. I was personally present for the key portions of any procedures. I have documented in the chart those procedures where I was not present during the key portions. I have reviewed the emergency nurses triage note. I agree with the chief complaint, past medical history, past surgical history, allergies, medications, social and family history as documented unless otherwise noted below. For Physician Assistant/ Nurse Practitioner cases/documentation I have personally evaluated this patient and have completed at least one if not all key elements of the E/M (history, physical exam, and MDM). Additional findings are as noted. I have personally seen and evaluated the patient. I find the patient's history and physical exam are consistent with the NP/PA documentation. I agree with the care provided, treatment rendered, disposition and follow-up plan. 28-year-old female with a history of lumbar stenosis, scheduled for laminectomy on Wednesday with neurosurgery presenting with worsening back pain. Patient has not been able to take her ibuprofen due to preop restrictions, has been having worsening pain. No incontinence. Also noted bruising/discoloration to the right ankle and lower extremity. Denies any falls or trauma. No history of thrombocytopenia or other vasculitis.       Exam:  General: Laying on the bed, awake, alert and in no acute distress  CV: normal rate and regular rhythm  Lungs: Breathing comfortably on room air with no tachypnea, hypoxia, or increased work of breathing  MSK: Tenderness in the low back  Skin: Nonblanching rash on the lower extremities, coalescing into ecchymotic appearing lesion on the lower right calf. Tenderness over the right ankle with some swelling and discoloration as well.     Plan:  Labs to rule out vasculitis, patient denies trauma  Treat back pain, avoid NSAIDs due to surgical restrictions        Elvin Bryan MD   Attending Emergency  Physician    (Please note that portions of this note were completed with a voice recognition program. Efforts were made to edit the dictations but occasionally words are mis-transcribed.)             Elvin Bryan MD  06/07/21 [id number]

## 2023-09-25 ENCOUNTER — APPOINTMENT (OUTPATIENT)
Dept: GENERAL RADIOLOGY | Age: 63
End: 2023-09-25

## 2023-09-25 ENCOUNTER — HOSPITAL ENCOUNTER (EMERGENCY)
Age: 63
Discharge: HOME | End: 2023-09-25
Attending: EMERGENCY MEDICINE

## 2023-09-25 VITALS
DIASTOLIC BLOOD PRESSURE: 49 MMHG | HEIGHT: 61 IN | HEART RATE: 75 BPM | SYSTOLIC BLOOD PRESSURE: 122 MMHG | OXYGEN SATURATION: 97 % | TEMPERATURE: 99.5 F | WEIGHT: 155 LBS | BODY MASS INDEX: 29.27 KG/M2 | RESPIRATION RATE: 14 BRPM

## 2023-09-25 DIAGNOSIS — R53.1 GENERALIZED WEAKNESS: Primary | ICD-10-CM

## 2023-09-25 PROBLEM — R56.9 SEIZURES (HCC): Status: ACTIVE | Noted: 2023-09-25

## 2023-09-25 PROBLEM — Z91.148 H/O MEDICATION NONCOMPLIANCE: Status: ACTIVE | Noted: 2023-09-25

## 2023-09-25 LAB
ALBUMIN SERPL-MCNC: 4 G/DL (ref 3.5–5.2)
ALBUMIN/GLOB SERPL: 1.1 {RATIO} (ref 1–2.5)
ALP SERPL-CCNC: 140 U/L (ref 35–104)
ALT SERPL-CCNC: 42 U/L (ref 5–33)
ANION GAP SERPL CALCULATED.3IONS-SCNC: 13 MMOL/L (ref 9–17)
AST SERPL-CCNC: 33 U/L
BASOPHILS # BLD: 0.06 K/UL (ref 0–0.2)
BASOPHILS NFR BLD: 1 % (ref 0–2)
BILIRUB DIRECT SERPL-MCNC: 0.1 MG/DL
BILIRUB INDIRECT SERPL-MCNC: 0.1 MG/DL (ref 0–1)
BILIRUB SERPL-MCNC: 0.2 MG/DL (ref 0.3–1.2)
BILIRUB UR QL STRIP: NEGATIVE
BUN BLD-MCNC: 16 MG/DL (ref 8–26)
BUN SERPL-MCNC: 18 MG/DL (ref 8–23)
CA-I BLD-SCNC: 1.13 MMOL/L (ref 1.15–1.33)
CALCIUM SERPL-MCNC: 8.8 MG/DL (ref 8.6–10.4)
CHLORIDE BLD-SCNC: 101 MMOL/L (ref 98–107)
CHLORIDE SERPL-SCNC: 98 MMOL/L (ref 98–107)
CLARITY UR: CLEAR
CO2 BLD CALC-SCNC: 26 MMOL/L (ref 22–30)
CO2 SERPL-SCNC: 22 MMOL/L (ref 20–31)
COLOR UR: YELLOW
CREAT SERPL-MCNC: 0.6 MG/DL (ref 0.5–0.9)
CRP SERPL HS-MCNC: 11.4 MG/L (ref 0–5)
EGFR, POC: >60 ML/MIN/1.73M2
EOSINOPHIL # BLD: 0.07 K/UL (ref 0–0.44)
EOSINOPHILS RELATIVE PERCENT: 1 % (ref 1–4)
EPI CELLS #/AREA URNS HPF: NORMAL /HPF (ref 0–5)
ERYTHROCYTE [DISTWIDTH] IN BLOOD BY AUTOMATED COUNT: 12.8 % (ref 11.8–14.4)
ERYTHROCYTE [SEDIMENTATION RATE] IN BLOOD BY PHOTOMETRIC METHOD: 43 MM/HR (ref 0–30)
GFR SERPL CREATININE-BSD FRML MDRD: >60 ML/MIN/1.73M2
GLUCOSE BLD-MCNC: 121 MG/DL (ref 74–100)
GLUCOSE SERPL-MCNC: 119 MG/DL (ref 70–99)
GLUCOSE UR STRIP-MCNC: NEGATIVE MG/DL
HCO3 VENOUS: 25.8 MMOL/L (ref 22–29)
HCT VFR BLD AUTO: 40.3 % (ref 36.3–47.1)
HCT VFR BLD AUTO: 43 % (ref 36–46)
HGB BLD-MCNC: 12.9 G/DL (ref 11.9–15.1)
HGB UR QL STRIP.AUTO: NEGATIVE
IMM GRANULOCYTES # BLD AUTO: 0.07 K/UL (ref 0–0.3)
IMM GRANULOCYTES NFR BLD: 1 %
KETONES UR STRIP-MCNC: NEGATIVE MG/DL
LACTIC ACID, WHOLE BLOOD: 1.4 MMOL/L (ref 0.7–2.1)
LACTIC ACID, WHOLE BLOOD: 2.3 MMOL/L (ref 0.7–2.1)
LEUKOCYTE ESTERASE UR QL STRIP: ABNORMAL
LEVETIRACETAM SERPL-MCNC: <2 UG/ML
LYMPHOCYTES NFR BLD: 2.1 K/UL (ref 1.1–3.7)
LYMPHOCYTES RELATIVE PERCENT: 24 % (ref 24–43)
MCH RBC QN AUTO: 31.1 PG (ref 25.2–33.5)
MCHC RBC AUTO-ENTMCNC: 32 G/DL (ref 28.4–34.8)
MCV RBC AUTO: 97.1 FL (ref 82.6–102.9)
MONOCYTES NFR BLD: 0.76 K/UL (ref 0.1–1.2)
MONOCYTES NFR BLD: 9 % (ref 3–12)
NEUTROPHILS NFR BLD: 64 % (ref 36–65)
NEUTS SEG NFR BLD: 5.67 K/UL (ref 1.5–8.1)
NITRITE UR QL STRIP: NEGATIVE
NRBC BLD-RTO: 0 PER 100 WBC
O2 SAT, VEN: 80.8 % (ref 60–85)
PCO2, VEN: 39.7 MM HG (ref 41–51)
PH UR STRIP: 7 [PH] (ref 5–8)
PH VENOUS: 7.42 (ref 7.32–7.43)
PLATELET # BLD AUTO: 369 K/UL (ref 138–453)
PMV BLD AUTO: 9 FL (ref 8.1–13.5)
PO2, VEN: 44.2 MM HG (ref 30–50)
POC ANION GAP: 9 MMOL/L (ref 7–16)
POC CREATININE: 0.8 MG/DL (ref 0.51–1.19)
POC HEMOGLOBIN (CALC): 14.5 G/DL (ref 12–16)
POC LACTIC ACID: 2 MMOL/L (ref 0.56–1.39)
POSITIVE BASE EXCESS, VEN: 1.2 MMOL/L (ref 0–3)
POTASSIUM BLD-SCNC: 4.1 MMOL/L (ref 3.5–4.5)
POTASSIUM SERPL-SCNC: 4.4 MMOL/L (ref 3.7–5.3)
PROT SERPL-MCNC: 7.5 G/DL (ref 6.4–8.3)
PROT UR STRIP-MCNC: NEGATIVE MG/DL
RBC # BLD AUTO: 4.15 M/UL (ref 3.95–5.11)
RBC #/AREA URNS HPF: NORMAL /HPF (ref 0–4)
SODIUM BLD-SCNC: 135 MMOL/L (ref 138–146)
SODIUM SERPL-SCNC: 133 MMOL/L (ref 135–144)
SP GR UR STRIP: 1.02 (ref 1–1.03)
TROPONIN I SERPL HS-MCNC: <6 NG/L (ref 0–14)
UROBILINOGEN UR STRIP-ACNC: NORMAL EU/DL (ref 0–1)
WBC #/AREA URNS HPF: NORMAL /HPF (ref 0–5)
WBC OTHER # BLD: 8.7 K/UL (ref 3.5–11.3)

## 2023-09-25 PROCEDURE — 82947 ASSAY GLUCOSE BLOOD QUANT: CPT

## 2023-09-25 PROCEDURE — 87086 URINE CULTURE/COLONY COUNT: CPT

## 2023-09-25 PROCEDURE — 2580000003 HC RX 258: Performed by: EMERGENCY MEDICINE

## 2023-09-25 PROCEDURE — 84484 ASSAY OF TROPONIN QUANT: CPT

## 2023-09-25 PROCEDURE — 83605 ASSAY OF LACTIC ACID: CPT

## 2023-09-25 PROCEDURE — 80048 BASIC METABOLIC PNL TOTAL CA: CPT

## 2023-09-25 PROCEDURE — 99223 1ST HOSP IP/OBS HIGH 75: CPT | Performed by: PSYCHIATRY & NEUROLOGY

## 2023-09-25 PROCEDURE — 82330 ASSAY OF CALCIUM: CPT

## 2023-09-25 PROCEDURE — 85014 HEMATOCRIT: CPT

## 2023-09-25 PROCEDURE — 85652 RBC SED RATE AUTOMATED: CPT

## 2023-09-25 PROCEDURE — 82803 BLOOD GASES ANY COMBINATION: CPT

## 2023-09-25 PROCEDURE — 80177 DRUG SCRN QUAN LEVETIRACETAM: CPT

## 2023-09-25 PROCEDURE — 71045 X-RAY EXAM CHEST 1 VIEW: CPT

## 2023-09-25 PROCEDURE — 96374 THER/PROPH/DIAG INJ IV PUSH: CPT

## 2023-09-25 PROCEDURE — 87040 BLOOD CULTURE FOR BACTERIA: CPT

## 2023-09-25 PROCEDURE — 86140 C-REACTIVE PROTEIN: CPT

## 2023-09-25 PROCEDURE — 82565 ASSAY OF CREATININE: CPT

## 2023-09-25 PROCEDURE — 6360000002 HC RX W HCPCS

## 2023-09-25 PROCEDURE — 80175 DRUG SCREEN QUAN LAMOTRIGINE: CPT

## 2023-09-25 PROCEDURE — 93005 ELECTROCARDIOGRAM TRACING: CPT | Performed by: EMERGENCY MEDICINE

## 2023-09-25 PROCEDURE — 85025 COMPLETE CBC W/AUTO DIFF WBC: CPT

## 2023-09-25 PROCEDURE — 84520 ASSAY OF UREA NITROGEN: CPT

## 2023-09-25 PROCEDURE — 80076 HEPATIC FUNCTION PANEL: CPT

## 2023-09-25 PROCEDURE — 81001 URINALYSIS AUTO W/SCOPE: CPT

## 2023-09-25 PROCEDURE — 80051 ELECTROLYTE PANEL: CPT

## 2023-09-25 PROCEDURE — 99285 EMERGENCY DEPT VISIT HI MDM: CPT

## 2023-09-25 RX ORDER — LEVETIRACETAM 10 MG/ML
3000 INJECTION INTRAVASCULAR
Status: COMPLETED | OUTPATIENT
Start: 2023-09-25 | End: 2023-09-25

## 2023-09-25 RX ORDER — 0.9 % SODIUM CHLORIDE 0.9 %
500 INTRAVENOUS SOLUTION INTRAVENOUS ONCE
Status: DISCONTINUED | OUTPATIENT
Start: 2023-09-25 | End: 2023-09-25 | Stop reason: HOSPADM

## 2023-09-25 RX ORDER — LAMOTRIGINE 25 MG/1
25 TABLET ORAL DAILY
COMMUNITY

## 2023-09-25 RX ORDER — TROSPIUM CHLORIDE 20 MG/1
20 TABLET, FILM COATED ORAL 2 TIMES DAILY
COMMUNITY

## 2023-09-25 RX ORDER — 0.9 % SODIUM CHLORIDE 0.9 %
1000 INTRAVENOUS SOLUTION INTRAVENOUS ONCE
Status: COMPLETED | OUTPATIENT
Start: 2023-09-25 | End: 2023-09-25

## 2023-09-25 RX ORDER — LACOSAMIDE 50 MG/1
100 TABLET ORAL 2 TIMES DAILY
COMMUNITY

## 2023-09-25 RX ADMIN — LEVETIRACETAM 3000 MG: 10 INJECTION, SOLUTION INTRAVENOUS at 16:06

## 2023-09-25 RX ADMIN — SODIUM CHLORIDE 1000 ML: 9 INJECTION, SOLUTION INTRAVENOUS at 15:04

## 2023-09-25 ASSESSMENT — ENCOUNTER SYMPTOMS
ABDOMINAL PAIN: 0
NAUSEA: 0
VOMITING: 0
SHORTNESS OF BREATH: 0
COUGH: 0

## 2023-09-25 ASSESSMENT — PAIN - FUNCTIONAL ASSESSMENT: PAIN_FUNCTIONAL_ASSESSMENT: NONE - DENIES PAIN

## 2023-09-25 NOTE — CONSULTS
the left to light touch and         Cerebellar   Intact finger-nose-finger testing. Intact heel-shin testing. No dysdiadochokinesia present. No tremors                          Reflex function     2/4 symmetric throughout . Downgoing plantar response bilaterally. (-)Sheffield's sign bilaterally        Gait                    Deferred            Labs:    Laboratory Testing:  CBC:   Recent Labs     09/25/23  1348   WBC 8.7   HGB 12.9        BMP:    Recent Labs     09/25/23  1322 09/25/23  1348   NA  --  133*   K  --  4.4   CL  --  98   CO2  --  22   BUN  --  18   CREATININE 0.8 0.6   GLUCOSE  --  119*         Lab Results   Component Value Date    ALT 42 (H) 09/25/2023    AST 33 (H) 09/25/2023    INR 0.9 06/06/2021       No results found for: \"PHENYTOIN\", \"PHENOBARB\", \"VALPROATE\", \"CBMZ\"      Imaging:    Imaging/Diagnostics:  XR CHEST PORTABLE    Result Date: 9/25/2023  EXAMINATION: ONE XRAY VIEW OF THE CHEST 9/25/2023 2:16 pm COMPARISON: None. HISTORY: ORDERING SYSTEM PROVIDED HISTORY: generalized weakness TECHNOLOGIST PROVIDED HISTORY: generalized weakness FINDINGS: HEART/MEDIASTINUM: The cardiomediastinal silhouette is within normal limits. PLEURA/LUNGS: There are no focal consolidations or pleural effusions. There is no appreciable pneumothorax. BONES/SOFT TISSUE: No acute abnormality. No radiographic evidence of acute pulmonary disease. Assessment and summary:     In summary, Fely Fairbanks is a 65 y.o. female with past medical history of focal epilepsy and hereditary spastic paraplegia presents with multiple requisitioned recent change in seizure medication. Was unable to get her home dose of Keppra after discharge but has been off of it for the 24 hours. Had multiple staring spells during the day brought in by daughter due to concerns of multiple seizures.       Plan:     Loaded with Keppra 3 g now  Increase home dose Keppra to 1 g twice daily  Close follow-up in 1 to 2 weeks with

## 2023-09-25 NOTE — ED NOTES
Pt to ED via EMS a/o x4 with c/o weakness that started this morning. Pt stated she was unable to ambulate this morning. Per family pt usually can get around alone. Pt had her seizure meds switched yesterday. Pt and family deny any seizure. Pt also reports hx of MS per family. Pt denies any pain at this time.  Pt placed on monitor, call light is in reach      MELA Cisneros RN  09/25/23 [id number]

## 2023-09-26 LAB
LAMOTRIGINE SERPL-MCNC: 2.3 UG/ML (ref 3–15)
MICROORGANISM SPEC CULT: NORMAL
SPECIMEN DESCRIPTION: NORMAL

## 2023-09-26 NOTE — DISCHARGE INSTRUCTIONS
You have been seen in the ER today for weakness. If you begin to experience any symptoms such as chest pain shortness of breath nausea vomiting dizziness drowsiness abdominal pain loss of consciousness or any other symptoms you find concerning please return to the ED for follow-up evaluation. If you have been given pain medication please take them only as prescribed. Do not take more medication than prescribed at any given time. Please follow-up with your primary care provider within 3-5 days for continued care, sooner if you have concerns. Your health status can be dramatically improved by changing your dietary habits. Simply by changing what you eat, your mood can improve, you can attain a healthy weight, your heart can become stronger, and you may experience an increase in overall energy and vitality. Please start to pay attention to what you eat on a daily basis. Start incorporating lots of colorful vegetables in your diet and reduce the amount of simple carbohydrates. Avoid eating simple processed sugars, such as cakes, cookies, ice cream etc. Additionally avoid foods high in processed fats, such as pizza, burgers, hot dogs, or anything deep fried etc. Complex carbohydrates can help control blood pressure and diabetes and may lower the risk of heart disease. Lean protein sources help build muscle and are essential for many body functions. Healthy fats help with hormonal balance and may help improve cholesterol levels. High fiber intake improves overall gut health, reduces blood pressure, and may optimize overall health. Gradually making these dietary lifestyle changes will dramatically improve your health and may reduce the number of hospital visits you will require over the course of your lifetime.      Choose from the following list of complex carbohydrates to include with each meal(30-40% of total calories per day, 4 calories per gram)  -Sweet potato  -quinoa  -brown rice  -oatmeal  -barley    Include

## 2023-09-27 LAB
EKG ATRIAL RATE: 104 BPM
EKG P AXIS: 72 DEGREES
EKG P-R INTERVAL: 152 MS
EKG Q-T INTERVAL: 358 MS
EKG QRS DURATION: 106 MS
EKG QTC CALCULATION (BAZETT): 470 MS
EKG R AXIS: 7 DEGREES
EKG T AXIS: 53 DEGREES
EKG VENTRICULAR RATE: 104 BPM

## 2023-09-30 LAB
MICROORGANISM SPEC CULT: NORMAL
MICROORGANISM SPEC CULT: NORMAL
SERVICE CMNT-IMP: NORMAL
SERVICE CMNT-IMP: NORMAL
SPECIMEN DESCRIPTION: NORMAL
SPECIMEN DESCRIPTION: NORMAL

## 2024-09-08 ENCOUNTER — HOSPITAL ENCOUNTER (EMERGENCY)
Age: 64
Discharge: HOME | End: 2024-09-08
Attending: EMERGENCY MEDICINE
Payer: MEDICARE

## 2024-09-08 VITALS
BODY MASS INDEX: 31.18 KG/M2 | SYSTOLIC BLOOD PRESSURE: 125 MMHG | DIASTOLIC BLOOD PRESSURE: 52 MMHG | WEIGHT: 165 LBS | HEART RATE: 97 BPM | OXYGEN SATURATION: 97 % | TEMPERATURE: 97.5 F | RESPIRATION RATE: 19 BRPM

## 2024-09-08 DIAGNOSIS — T40.715A ADVERSE EFFECT OF CANNABIS, INITIAL ENCOUNTER: Primary | ICD-10-CM

## 2024-09-08 PROCEDURE — 99284 EMERGENCY DEPT VISIT MOD MDM: CPT

## 2024-09-08 PROCEDURE — 2580000003 HC RX 258

## 2024-09-08 PROCEDURE — 93005 ELECTROCARDIOGRAM TRACING: CPT

## 2024-09-08 PROCEDURE — 96360 HYDRATION IV INFUSION INIT: CPT

## 2024-09-08 PROCEDURE — 96361 HYDRATE IV INFUSION ADD-ON: CPT

## 2024-09-08 RX ORDER — AZITHROMYCIN 250 MG/1
250 TABLET, FILM COATED ORAL DAILY
COMMUNITY

## 2024-09-08 RX ORDER — DIVALPROEX SODIUM 250 MG/1
250 TABLET, DELAYED RELEASE ORAL 3 TIMES DAILY
COMMUNITY
Start: 2017-06-30

## 2024-09-08 RX ORDER — 0.9 % SODIUM CHLORIDE 0.9 %
500 INTRAVENOUS SOLUTION INTRAVENOUS ONCE
Status: COMPLETED | OUTPATIENT
Start: 2024-09-08 | End: 2024-09-08

## 2024-09-08 RX ORDER — CIPROFLOXACIN 500 MG/1
500 TABLET, FILM COATED ORAL 2 TIMES DAILY
COMMUNITY

## 2024-09-08 RX ORDER — ACETAMINOPHEN 500 MG
1000 TABLET ORAL EVERY 6 HOURS PRN
COMMUNITY
Start: 2021-06-14 | End: 2024-09-21

## 2024-09-08 RX ADMIN — SODIUM CHLORIDE 500 ML: 9 INJECTION, SOLUTION INTRAVENOUS at 01:37

## 2024-09-08 ASSESSMENT — PAIN - FUNCTIONAL ASSESSMENT: PAIN_FUNCTIONAL_ASSESSMENT: NONE - DENIES PAIN

## 2024-09-10 LAB
EKG ATRIAL RATE: 95 BPM
EKG P AXIS: 70 DEGREES
EKG P-R INTERVAL: 184 MS
EKG Q-T INTERVAL: 384 MS
EKG QRS DURATION: 102 MS
EKG QTC CALCULATION (BAZETT): 482 MS
EKG R AXIS: 42 DEGREES
EKG T AXIS: 64 DEGREES
EKG VENTRICULAR RATE: 95 BPM